# Patient Record
Sex: MALE | Race: WHITE
[De-identification: names, ages, dates, MRNs, and addresses within clinical notes are randomized per-mention and may not be internally consistent; named-entity substitution may affect disease eponyms.]

---

## 2016-12-15 VITALS
DIASTOLIC BLOOD PRESSURE: 92 MMHG | SYSTOLIC BLOOD PRESSURE: 157 MMHG | SYSTOLIC BLOOD PRESSURE: 157 MMHG | DIASTOLIC BLOOD PRESSURE: 92 MMHG

## 2020-02-11 ENCOUNTER — HOSPITAL ENCOUNTER (OUTPATIENT)
Dept: HOSPITAL 63 - PMG | Age: 30
Discharge: HOME | End: 2020-02-11
Payer: COMMERCIAL
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
## 2020-02-11 DIAGNOSIS — S46.912A: Primary | ICD-10-CM
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
## 2020-02-11 PROCEDURE — 73030 X-RAY EXAM OF SHOULDER: CPT

## 2020-02-11 NOTE — RAD
SHOULDER 2+V LEFT 

 

DATE: 2/11/2020 12:00 AM

 

INDICATION:  Pain  

 

COMPARISON:  None.

 

FINDINGS:  

 

Bones: There is no evidence of acute fracture or dislocation.

 

Joints: The joint spaces are normal.  The acromiohumeral distance is not 

narrowed. 

 

Miscellaneous: No abnormal soft tissue calcifications in the shoulder.

 

IMPRESSION:  

 

Normal exam

 

Electronically signed by: Arnav Valdez MD (2/11/2020 3:53 PM) [id number]

## 2020-03-12 ENCOUNTER — HOSPITAL ENCOUNTER (OUTPATIENT)
Dept: HOSPITAL 63 - DXRAD | Age: 30
End: 2020-03-12
Payer: COMMERCIAL

## 2020-03-12 DIAGNOSIS — M17.0: Primary | ICD-10-CM

## 2020-03-12 PROCEDURE — 73565 X-RAY EXAM OF KNEES: CPT

## 2020-03-12 PROCEDURE — 73560 X-RAY EXAM OF KNEE 1 OR 2: CPT

## 2020-03-12 NOTE — RAD
Standing AP view both knees and 2 views the right knee without comparison 

for right knee pain.

 

FINDINGS: There is no fracture, dislocation, or acute osseous abnormality 

identified. There is very subtle narrowing of the medial joint compartment

of both knees. Lateral and patellofemoral compartments of the right knee 

are well-maintained. 2 fixation screws are seen within the right tibial 

metaphysis with no periprosthetic lucency.

 

IMPRESSION:

1. No fracture or acute osseous abnormality of either knee.

2. Mild monocompartmental osteoarthritis which is bilateral and 

symmetrical.

 

Electronically signed by: Daniel Iraheta MD (3/12/2020 3:21 PM) [id number]

## 2021-12-26 ENCOUNTER — HOSPITAL ENCOUNTER (EMERGENCY)
Dept: HOSPITAL 63 - ER | Age: 31
Discharge: HOME | End: 2021-12-26
Payer: COMMERCIAL

## 2021-12-26 VITALS — SYSTOLIC BLOOD PRESSURE: 148 MMHG | DIASTOLIC BLOOD PRESSURE: 68 MMHG

## 2021-12-26 VITALS — HEIGHT: 70 IN | WEIGHT: 288.59 LBS | BODY MASS INDEX: 41.31 KG/M2

## 2021-12-26 DIAGNOSIS — H01.004: Primary | ICD-10-CM

## 2021-12-26 DIAGNOSIS — H01.001: ICD-10-CM

## 2021-12-26 DIAGNOSIS — J45.909: ICD-10-CM

## 2021-12-26 PROCEDURE — 99283 EMERGENCY DEPT VISIT LOW MDM: CPT

## 2021-12-26 NOTE — PHYS DOC
Past History


Past Medical History:  Asthma


 (JASPAL PEARCE)


Past Surgical History:  Other


Additional Past Surgical Histo:  lung surgery as child, eyes as child,


 (JASPAL PEARCE)


Alcohol Use:  None


Drug Use:  None


 (JASPAL PEARCE)





Adult General


Chief Complaint


Chief Complaint:  EYE PROBLEMS





HPI


HPI





Patient is a 31-year-old male who presents emergency department complaining of 

puffy upper eyelids for the past 4 days, denies itching, denies pain, denies 

visual disturbances.  Patient reports he wears glasses, does not wear contacts. 

Patient denies fever or chills.  Patient reports his last tetanus immunization 

was less than 5 years ago.  Patient denies other physical complaints or physical

concerns.


 (JASPAL PEARCE)





Review of Systems


Review of Systems





14 body systems of review of systems have been reviewed.  See HPI for pertinent 

positives and negative responses, otherwise all other systems are negative, 

nonpertinent or noncontributory.


Constitutional: Negative except as outlined in HPI above.


Skin: Negative except as outlined in HPI above.


Eyes:   Negative except as outlined in HPI above.


HENT: Negative except as outlined in HPI above.


Respiratory:   Negative except as outlined in HPI above.


Cardiovascular:   Negative except as outlined in HPI above.


GI:   Negative except as outlined in HPI above.


:  Negative except as outlined in HPI above.


Musculoskeletal:   Negative except as outlined in HPI above.


Integument:   Negative except as outlined in HPI above.


Neurologic:   Negative except as outlined in HPI above.


Endocrine:   Negative except as outlined in HPI above.


Lymphatic:  Negative except as outlined in HPI above.


Psychiatric:  Negative except as outlined in HPI above.


 (JASPAL PEARCE)





Current Medications


Current Medications





Current Medications








 Medications


  (Trade)  Dose


 Ordered  Sig/Gayle  Start Time


 Stop Time Status Last Admin


Dose Admin


 


 Polymyxin/


 Trimethoprim


 Sulfate


  (Polytrim)  1 drop  1X  ONCE  12/26/21 16:30


 12/26/21 16:31 UNV  











 (JASPAL PEARCE)





Allergies


Allergies





Allergies








Coded Allergies Type Severity Reaction Last Updated Verified


 


  No Known Drug Allergies    1/14/15 No








 (JASPAL PEARCE)





Physical Exam


Physical Exam





Constitutional: Well developed, well nourished, no acute distress, non-toxic 

appearance.  31-year-old male in no apparent distress.


HENT: Normocephalic, atraumatic.


Eyes: Conjunctiva normal, no discharge.  Upper eyelids puffy without erythema, 

no drainage, no scleral abnormalities appreciated, PERRLA, visual acuity left 

eye 20/50, OD 20/20, OU 20/20 with corrected vision lenses.  Satisfactory 6 

cardinal eye movements.


Neck: Normal range of motion, no stridor.


Cardiovascular: No cyanosis appreciated, distal cap refill less than 2 seconds.


Lungs & Thorax: Patient is in no respiratory distress, no audible adventitious 

lung sounds appreciated.


Abdomen: Nontender, no abnormalities noted.


Skin: Warm, dry, no erythema, no rash.  


Back: No tenderness, no deformities.


Extremities: No tenderness, no cyanosis, no clubbing, ROM intact, no edema.  


Neurologic: Alert and oriented X 3, normal motor function, normal sensory 

function, no focal deficits noted. 


Psychologic: Affect normal, judgement normal, mood normal. 


 (JASPAL PEARCE)





Current Patient Data


Vital Signs





                                   Vital Signs








  Date Time  Temp Pulse Resp B/P (MAP) Pulse Ox O2 Delivery O2 Flow Rate FiO2


 


12/26/21 14:58 97.6 90 16 157/92 (113) 97 Room Air  








 (JASPAL PEARCE)





EKG


EKG


[]


 (JASPAL PEARCE)





Radiology/Procedures


Radiology/Procedures


[]


 (JASPAL PEARCE)





Heart Score


C/O Chest Pain:  No


Risk Factors:


Risk Factors:  DM, Current or recent (<one month) smoker, HTN, HLP, family 

history of CAD, obesity.


Risk Scores:


Risk Factors:  DM, Current or recent (<one month) smoker, HTN, HLP, family 

history of CAD, obesity.


 (JASPAL PEARCE)





Course & Med Decision Making


Course & Med Decision Making


Pertinent Labs and Imaging studies reviewed. (See chart for details)





31-year-old male, vital signs reviewed, presents emergency department concerning

 of her eye puffiness.  Physical examination consistent with blepharitis.  Will 

start on Polytrim eyedrops 1 drop each eye every 3 hours while awake for the 

next 7 days.  Strict follow-up with ophthalmology tomorrow.  Return to ER 

precautions and concerns were reviewed.  Discussed medication treatment with 

patient.  Patient gave verbal understanding of and is amenable to ED discharge 

planning.





Discussed with the patient all findings and diagnostic testing as well as the 

need to follow-up with their primary care provider for further evaluation and 

treatment or return to the ED if any new or worsening symptoms.  Strict return 

precautions were also discussed at length, the patient voiced understanding and 

agreement with the discharge planning.  The patient was nontoxic in appearance, 

in no apparent distress, and hemodynamically stable at the time of disposition.


 (JASPAL PEARCE)


Course & Med Decision Making


I was the Attending physician on the above date of service of this patient. This

 patient was evaluated, examined, treated, and dispositioned from the emergency 

department by the mid-level practitioner.  Although I was working at the time , 

no assistance was requested. 





Electronically signed, Laura Pena DO





 (LAURA PENA DO)


Jazz Disclaimer


Dragon Disclaimer


This electronic medical record was generated, in whole or in part, using a voice

 recognition dictation system.


 (JASPAL PEARCE)





Departure


Departure:


Impression:  


   Primary Impression:  


   Blepharitis


Disposition:  01 HOME / SELF CARE / HOMELESS


Condition:  GOOD


Referrals:  


RHODA PAULA (PCP)


Patient Instructions:  Blepharitis





Additional Instructions:  


You were seen in the emergency department today for an eyelid infection.  This 

examination is concerning for blepharitis.  I have started you on an eyedrop 

that you will place in each eye every 3 hours while awake for the next week.  As

 we discussed please follow-up with your eye doctor tomorrow.  Return to the 

emergency department for worsening symptoms or other concerns.  Thank you for 

visiting our Emergency Department.  It was a pleasure taking care of you today 

in the emergency department and we appreciate you trusting us with your care. If

 any additional problems come up don't hesitate to return to visit us. Please 

follow up with your primary care provider so they can plan additional care if 

needed and know about the problem that you had. If symptoms worsen come back to 

the Emergency Department. Any concerning symptoms that start such as chest pain,

 shortness of air, weakness or numbness on one side of the body, running high 

fevers or any other concerning symptoms return to the ER.


Scripts


Polymyxin B Sulf/Trimethoprim (POLYTRIM EYE DROPS) 10 Ml Drops


1 DROP EACHEYE Q3HRS for eye infection, #10 ML 0 Refills


   Prov: JASPAL PEARCE         12/26/21





Problem Qualifiers








   Primary Impression:  


   Blepharitis


   Blepharitis type:  unspecified type  Laterality:  bilateral  Eyelid:  upper  

   Qualified Codes:  H01.001 - Unspecified blepharitis right upper eyelid; 

   H01.004 - Unspecified blepharitis left upper eyelid








JASPAL PEARCE       Dec 26, 2021 16:35


LAURA PENA DO                 Dec 29, 2021 07:01

## 2022-02-13 ENCOUNTER — HOSPITAL ENCOUNTER (EMERGENCY)
Dept: HOSPITAL 63 - ER | Age: 32
Discharge: HOME | End: 2022-02-13
Payer: COMMERCIAL

## 2022-02-13 VITALS — BODY MASS INDEX: 41.31 KG/M2 | WEIGHT: 288.59 LBS | HEIGHT: 70 IN

## 2022-02-13 VITALS — DIASTOLIC BLOOD PRESSURE: 86 MMHG | SYSTOLIC BLOOD PRESSURE: 142 MMHG

## 2022-02-13 DIAGNOSIS — Z20.822: ICD-10-CM

## 2022-02-13 DIAGNOSIS — J45.909: ICD-10-CM

## 2022-02-13 DIAGNOSIS — T78.3XXA: Primary | ICD-10-CM

## 2022-02-13 LAB
ALBUMIN SERPL-MCNC: 4.1 G/DL (ref 3.4–5)
ALBUMIN/GLOB SERPL: 1.2 {RATIO} (ref 1–1.7)
ALP SERPL-CCNC: 98 U/L (ref 46–116)
ALT SERPL-CCNC: 188 U/L (ref 16–63)
ANION GAP SERPL CALC-SCNC: 14 MMOL/L (ref 6–14)
AST SERPL-CCNC: 252 U/L (ref 15–37)
BASOPHILS # BLD AUTO: 0.1 X10^3/UL (ref 0–0.2)
BASOPHILS NFR BLD: 1 % (ref 0–3)
BILIRUB SERPL-MCNC: 0.8 MG/DL (ref 0.2–1)
BUN/CREAT SERPL: 9 (ref 6–20)
CA-I SERPL ISE-MCNC: 12 MG/DL (ref 8–26)
CALCIUM SERPL-MCNC: 9.7 MG/DL (ref 8.5–10.1)
CHLORIDE SERPL-SCNC: 103 MMOL/L (ref 98–107)
CO2 SERPL-SCNC: 24 MMOL/L (ref 21–32)
CREAT SERPL-MCNC: 1.3 MG/DL (ref 0.7–1.3)
EOSINOPHIL NFR BLD: 0.1 X10^3/UL (ref 0–0.7)
EOSINOPHIL NFR BLD: 1 % (ref 0–3)
ERYTHROCYTE [DISTWIDTH] IN BLOOD BY AUTOMATED COUNT: 13 % (ref 11.5–14.5)
GFR SERPLBLD BASED ON 1.73 SQ M-ARVRAT: 63.6 ML/MIN
GLOBULIN SER-MCNC: 3.5 G/DL (ref 2.2–3.8)
GLUCOSE SERPL-MCNC: 283 MG/DL (ref 70–99)
HCT VFR BLD CALC: 51.8 % (ref 39–53)
HGB BLD-MCNC: 17.9 G/DL (ref 13–17.5)
LYMPHOCYTES # BLD: 6.7 X10^3/UL (ref 1–4.8)
LYMPHOCYTES NFR BLD AUTO: 56 % (ref 24–48)
MCH RBC QN AUTO: 30 PG (ref 25–35)
MCHC RBC AUTO-ENTMCNC: 35 G/DL (ref 31–37)
MCV RBC AUTO: 88 FL (ref 79–100)
MONO #: 0.9 X10^3/UL (ref 0–1.1)
MONOCYTES NFR BLD: 7 % (ref 0–9)
NEUT #: 4.2 X10^3UL (ref 1.8–7.7)
NEUTROPHILS NFR BLD AUTO: 35 % (ref 31–73)
PLATELET # BLD AUTO: 297 X10^3/UL (ref 140–400)
POTASSIUM SERPL-SCNC: 4.1 MMOL/L (ref 3.5–5.1)
PROT SERPL-MCNC: 7.6 G/DL (ref 6.4–8.2)
RBC # BLD AUTO: 5.91 X10^6/UL (ref 4.3–5.7)
SODIUM SERPL-SCNC: 141 MMOL/L (ref 136–145)
WBC # BLD AUTO: 11.9 X10^3/UL (ref 4–11)

## 2022-02-13 PROCEDURE — 96372 THER/PROPH/DIAG INJ SC/IM: CPT

## 2022-02-13 PROCEDURE — U0003 INFECTIOUS AGENT DETECTION BY NUCLEIC ACID (DNA OR RNA); SEVERE ACUTE RESPIRATORY SYNDROME CORONAVIRUS 2 (SARS-COV-2) (CORONAVIRUS DISEASE [COVID-19]), AMPLIFIED PROBE TECHNIQUE, MAKING USE OF HIGH THROUGHPUT TECHNOLOGIES AS DESCRIBED BY CMS-2020-01-R: HCPCS

## 2022-02-13 PROCEDURE — 87426 SARSCOV CORONAVIRUS AG IA: CPT

## 2022-02-13 PROCEDURE — 80053 COMPREHEN METABOLIC PANEL: CPT

## 2022-02-13 PROCEDURE — 71045 X-RAY EXAM CHEST 1 VIEW: CPT

## 2022-02-13 PROCEDURE — 85025 COMPLETE CBC W/AUTO DIFF WBC: CPT

## 2022-02-13 PROCEDURE — 94640 AIRWAY INHALATION TREATMENT: CPT

## 2022-02-13 PROCEDURE — 99285 EMERGENCY DEPT VISIT HI MDM: CPT

## 2022-02-13 PROCEDURE — 93005 ELECTROCARDIOGRAM TRACING: CPT

## 2022-02-13 PROCEDURE — 36415 COLL VENOUS BLD VENIPUNCTURE: CPT

## 2022-02-13 PROCEDURE — 96375 TX/PRO/DX INJ NEW DRUG ADDON: CPT

## 2022-02-13 PROCEDURE — 96374 THER/PROPH/DIAG INJ IV PUSH: CPT

## 2022-02-13 PROCEDURE — 96361 HYDRATE IV INFUSION ADD-ON: CPT

## 2022-02-13 NOTE — PHYS DOC
Past History


Past Medical History:  Asthma, IBS


Past Surgical History:  Other


Additional Past Surgical Histo:  lung surgery as child, eyes as child,


Alcohol Use:  None


Drug Use:  None





Adult General


Chief Complaint


Chief Complaint:  ALLERGIC REACTION





HPI


HPI





Patient is a 31-year-old male presenting via POV for allergic reaction.  Reports

onset was 30 minutes prior to arrival.  No obvious ingestions or exposures but 

patient does admit to being non-insulin-dependent type II diabetic with high 

blood pressure and currently takes lisinopril.  States he has been on this me

dication for proximately 1 to 2 years.  Reports he noticed generalized skin 

changes that were pruritic in nature with eventual enlargement of lips which got

him anxious.  States he had no issue with secretions but was concerned and 

started to hyperventilate prompting fianc to transport patient to our facility





Review of Systems


Review of Systems


Fourteen body systems of review of systems have been reviewed. See HPI for 

pertinent positives and negative responses, other wise all other systems are 

negative, non-pertinent or non-contributory





Allergies


Allergies





Allergies








Coded Allergies Type Severity Reaction Last Updated Verified


 


  No Known Drug Allergies    1/14/15 No











Physical Exam


Physical Exam


Constitutional: Age-appropriate, obese, moderate respiratory distress and 

anxious


HENT: Normocephalic, atraumatic, bilateral external ears normal, oropharynx 

moist, no oral exudates, nose normal.  Lips are swollen consistent with 

angioedema without gross tongue, soft palate or posterior oropharyngeal 

involvement


Eyes: PERRLA, EOMI, conjunctiva normal, no discharge.  


Neck: Normal range of motion, no tenderness, supple, no stridor.  


Cardiovascular: Heart rate tachycardic, sinus rhythm, no murmurs rubs or gallops


Lungs & Thorax: Speaking in few word sentences, diffuse wheezing in all lobes, 

mild respiratory distress with accessory muscle use and neck noted


Abdomen: Bowel sounds normal, soft, no tenderness, no masses, no pulsatile 

masses.  Nonsurgical abdomen, no peritoneal signs


Skin: Warm, dry, generalized urticaria on present on entire body but sparing 

palms and soles


Back: No tenderness, no CVA tenderness.  


Extremities: No tenderness, no cyanosis, no clubbing, ROM intact, no edema.  


Neurologic: Alert and oriented X 3, grossly normal motor & sensory function, no 

focal deficits noted. 


Psychologic: Anxious affect and mood





Current Patient Data


Vital Signs





Vital Signs








  Date Time  Temp Pulse Resp B/P (MAP) Pulse Ox O2 Delivery O2 Flow Rate FiO2


 


2/13/22 06:40  137 30 112/85 (94) 90 Room Air  








Vital Signs








  Date Time  Temp Pulse Resp B/P (MAP) Pulse Ox O2 Delivery O2 Flow Rate FiO2


 


2/13/22 06:40  137 30 112/85 (94) 90 Room Air  








Lab Results





Laboratory Tests








Test


 2/13/22


06:54


 


White Blood Count 11.9 x10^3/uL 


 


Red Blood Count 5.91 x10^6/uL 


 


Hemoglobin 17.9 g/dL 


 


Hematocrit 51.8 % 


 


Mean Corpuscular Volume 88 fL 


 


Mean Corpuscular Hemoglobin 30 pg 


 


Mean Corpuscular Hemoglobin


Concent 35 g/dL 





 


Red Cell Distribution Width 13.0 % 


 


Platelet Count 297 x10^3/uL 


 


Neutrophils (%) (Auto) 35 % 


 


Lymphocytes (%) (Auto) 56 % 


 


Monocytes (%) (Auto) 7 % 


 


Eosinophils (%) (Auto) 1 % 


 


Basophils (%) (Auto) 1 % 


 


Neutrophils # (Auto) 4.2 x10^3uL 


 


Lymphocytes # (Auto) 6.7 x10^3/uL 


 


Monocytes # (Auto) 0.9 x10^3/uL 


 


Eosinophils # (Auto) 0.1 x10^3/uL 


 


Basophils # (Auto) 0.1 x10^3/uL 


 


Sodium Level 141 mmol/L 


 


Potassium Level 4.1 mmol/L 


 


Chloride Level 103 mmol/L 


 


Carbon Dioxide Level 24 mmol/L 


 


Anion Gap 14 


 


Blood Urea Nitrogen 12 mg/dL 


 


Creatinine 1.3 mg/dL 


 


Estimated GFR


(Cockcroft-Gault) 63.6 





 


BUN/Creatinine Ratio 9 


 


Glucose Level 283 mg/dL 


 


Calcium Level 9.7 mg/dL 


 


Total Bilirubin 0.8 mg/dL 


 


Aspartate Amino Transf


(AST/SGOT) 252 U/L 





 


Alanine Aminotransferase


(ALT/SGPT) 188 U/L 





 


Alkaline Phosphatase 98 U/L 


 


Total Protein 7.6 g/dL 


 


Albumin 4.1 g/dL 


 


Albumin/Globulin Ratio 1.2 








Current Medications








 Medications


  (Trade)  Dose


 Ordered  Sig/Gayle


 Route


 PRN Reason  Start Time


 Stop Time Status Last Admin


Dose Admin


 


 Diphenhydramine


 HCl


  (Benadryl)  50 mg  1X  ONCE


 IVP


   2/13/22 07:00


 2/13/22 07:06 DC 2/13/22 06:56





 


 Famotidine


  (Pepcid Vial)  20 mg  1X  ONCE


 IVP


   2/13/22 07:00


 2/13/22 07:06 DC 2/13/22 06:56





 


 Methylprednisolone


 Sodium Succinate


  (SOLU-Medrol


 125MG VIAL)  125 mg  1X  ONCE


 IV


   2/13/22 07:00


 2/13/22 07:06 DC 2/13/22 06:55





 


 Epinephrine HCl


  (EPINEPHrine


 AMPULE)  0.3 mg  1X  ONCE


 IM


   2/13/22 07:00


 2/13/22 07:06 DC 2/13/22 07:03





 


 Albuterol/


 Ipratropium


  (Duoneb)  3 ml  1X  ONCE


 NEB


   2/13/22 07:15


 2/13/22 07:16 DC 2/13/22 07:06





 


 Sodium Chloride  1,000 ml @ 


 1,000 mls/hr  Q1H


 IV


   2/13/22 07:15


 2/13/22 08:14 DC 2/13/22 07:06





 


 Albuterol/


 Ipratropium


  (Duoneb)  3 ml  STK-MED ONCE


 .ROUTE


   2/13/22 07:04


 2/13/22 07:04 DC  





 


 Albuterol/


 Ipratropium


  (Duoneb)  3 ml  1X  ONCE


 NEB


   2/13/22 07:15


 2/13/22 07:18 DC 2/13/22 07:15





 


 Lactated Ringer's  1,000 ml @ 


 75 mls/hr  1X  ONCE


 IV


   2/13/22 07:45


 2/13/22 21:04  2/13/22 07:45





 


 Acetaminophen


  (Tylenol)  650 mg  PRN Q4HRS  PRN


 PO


 FEVER > 100.3'F  2/13/22 08:15


 2/14/22 08:14   





 


 Nitroglycerin


  (Nitrostat)  0.4 mg  PRN Q5MIN  PRN


 SL


 CHEST PAIN  2/13/22 08:15


 2/14/22 08:14   














EKG


EKG


EKG ordered and interpreted by myself at 0720 hrs. as sinus rhythm at 100 bpm, 

unremarkable intervals, no axis deviation, no obvious ischemic damage, x1 PVC 

present, no STEMI





Radiology/Procedures


Radiology/Procedures





XR CHEST 1V





CLINICAL INDICATIONS: Reason: allergic reaction / Spl. Instructions:  / History:

 





COMPARISON: December 8, 2016.





Findings: No acute lung infiltrate or pleural effusion or pulmonary edema or 

lung mass or pneumothorax is seen.  The heart size, pulmonary vasculature, 

mediastinum and both lenny are unremarkable. 








IMPRESSION: No acute radiographic abnormality is seen.





Electronically signed by: Anil Tran MD (2/13/2022 7:17 AM) [id number]





Heart Score


C/O Chest Pain:  No


HEART Score for Chest Pain:  








HEART Score for Chest Pain Response (Comments) Value


 


History Slighlty/Non-Suspicious 0


 


ECG Nonspecific Repolarizatio 1


 


Age < 45 0


 


Risk Factors                            1 or 2 Risk Factors 1


 


Troponin < Normal Limit 0


 


Total  2








Risk Factors:


Risk Factors:  DM, Current or recent (<one month) smoker, HTN, HLP, family 

history of CAD, obesity.


Risk Scores:


Risk Factors:  DM, Current or recent (<one month) smoker, HTN, HLP, family 

history of CAD, obesity.





Course & Med Decision Making


Course & Med Decision Making


Airway patent, breathing labored, IV access and vitals obtained concerning for 

oxygen saturations in the low 90s on arrival


Brief history and physical exam obtained concerning for angioedema and patient 

who utilizes lisinopril.  125 Solu-Medrol, 50 mg IV Benadryl, 20 mg Pepcid, and 

0.3 mg IM epinephrine administered


Patient started on 2 L of oxygen via nasal cannula.  DuoNeb treatment 

administered for generalized wheezing.  1 L normal saline and subsequent 1 L 

lactic ringer IV fluid administered


Patient observed aggressively with serial examinations every 5 minutes with 

improvement in patient's generalized urticaria and angioedema involving the 

lips.


Further history elicited.  Confirm no new allergens or exposures, he has never 

had an allergic reaction like this in the past.  He has been taking lisinopril 

for over a year.  No known history of C1 esterase deficiency or other concerning

comorbid conditions


Nonetheless, despite improvement in patient's symptoms, he was given IM 

epinephrine.  He has high risk for decompensation if discharged home and so, 

joint decision made to admit for further observation.  Hospitalist contacted, 

case reviewed and patient accepted for admission





Dragon Disclaimer


Dragon Disclaimer


This electronic medical record was generated, in whole or in part, using a voice

recognition dictation system.





Departure


Departure:


Impression:  


   Primary Impression:  


   Angioedema


Disposition:  09 ADMITTED AS INPATIENT


Admitting Physician:  Jayson Dunn


Condition:  STABLE


Referrals:  


RHODA PAULA (PCP)











LAURA PENA DO                 Feb 13, 2022 06:59

## 2022-02-13 NOTE — RAD
XR CHEST 1V



CLINICAL INDICATIONS: Reason: allergic reaction / Spl. Instructions:  / History:  



COMPARISON: December 8, 2016.



Findings: No acute lung infiltrate or pleural effusion or pulmonary edema or lung mass or pneumothora
x is seen.  The heart size, pulmonary vasculature, mediastinum and both lenny are unremarkable. 





IMPRESSION: No acute radiographic abnormality is seen.



Electronically signed by: Anil Tran MD (2/13/2022 7:17 AM) [id number]

## 2022-02-13 NOTE — EKG
[facility] [address], [place] [address]

Test Date:    2022               Test Time:    07:16:51

Pat Name:     DALIA KNUTSON           Department:   

Patient ID:   [id number]           Room:          

Gender:       M                        Technician:   MENDOZA

:          1990               Requested By: LAURA PENA

Order Number: [id number]            Reading MD:   Logan Hogue MD

                                 Measurements

Intervals                              Axis          

Rate:         100                      P:            31

WY:           158                      QRS:          49

QRSD:         92                       T:            16

QT:           338                                    

QTc:          439                                    

                           Interpretive Statements

SINUS ARRHYTHMIA

VENTRICULAR PREMATURE COMPLEX(ES)

Electronically Signed On 2022 8:21:59 CST by Logan Hogue MD